# Patient Record
Sex: FEMALE | Race: WHITE | NOT HISPANIC OR LATINO | Employment: OTHER | URBAN - NONMETROPOLITAN AREA
[De-identification: names, ages, dates, MRNs, and addresses within clinical notes are randomized per-mention and may not be internally consistent; named-entity substitution may affect disease eponyms.]

---

## 2020-01-01 ENCOUNTER — OFFICE VISIT (OUTPATIENT)
Dept: URGENT CARE | Facility: PHYSICIAN GROUP | Age: 70
End: 2020-01-01
Payer: MEDICARE

## 2020-01-01 VITALS
OXYGEN SATURATION: 97 % | WEIGHT: 172 LBS | HEART RATE: 76 BPM | RESPIRATION RATE: 14 BRPM | SYSTOLIC BLOOD PRESSURE: 116 MMHG | DIASTOLIC BLOOD PRESSURE: 72 MMHG | TEMPERATURE: 97.8 F

## 2020-01-01 DIAGNOSIS — H10.9 CONJUNCTIVITIS OF BOTH EYES, UNSPECIFIED CONJUNCTIVITIS TYPE: ICD-10-CM

## 2020-01-01 PROCEDURE — 99204 OFFICE O/P NEW MOD 45 MIN: CPT | Performed by: PHYSICIAN ASSISTANT

## 2020-01-01 RX ORDER — POLYMYXIN B SULFATE AND TRIMETHOPRIM 1; 10000 MG/ML; [USP'U]/ML
1 SOLUTION OPHTHALMIC EVERY 4 HOURS
Qty: 10 ML | Refills: 0 | Status: SHIPPED | OUTPATIENT
Start: 2020-01-01 | End: 2020-01-08

## 2020-01-01 NOTE — PROGRESS NOTES
Chief Complaint   Patient presents with   • Eye Problem     B eyes redness       HISTORY OF PRESENT ILLNESS: Patient is a 69 y.o. female who presents today for the following:    Patient comes in for evaluation of bilateral eye redness and itching that started a few days ago.  She denies ocular pain, blurry vision, foreign body sensation, and contact lenses.  She has woken up with exudate in both eyes, having to clear them both.    There are no active problems to display for this patient.      Allergies:Codeine    Current Outpatient Medications Ordered in Epic   Medication Sig Dispense Refill   • polymixin-trimethoprim (POLYTRIM) 76964-0.1 UNIT/ML-% Solution Place 1 Drop in both eyes every 4 hours for 7 days. 10 mL 0     No current Epic-ordered facility-administered medications on file.        History reviewed. No pertinent past medical history.    Social History     Tobacco Use   • Smoking status: Never Smoker   • Smokeless tobacco: Never Used   Substance Use Topics   • Alcohol use: Not on file   • Drug use: Not on file       No family status information on file.   History reviewed. No pertinent family history.    Review of Systems:   Constitutional ROS: No unexpected change in weight, No weakness, No fatigue  Eye ROS: Positive for eye redness and exudate bilaterally.  Ear ROS: No drainage, No tinnitus or vertigo, No recent change in hearing  Mouth/Throat ROS: No teeth or gum problems, No bleeding gums, No tongue complaints  Neck ROS: No swollen glands, No significant pain in neck  Pulmonary ROS: No chronic cough, sputum, or hemoptysis, No dyspnea on exertion, No wheezing  Cardiovascular ROS: No diaphoresis, No edema, No palpitations  Musculoskeletal/Extremities ROS: No peripheral edema, No pain, redness or swelling on the joints  Hematologic/Lymphatic ROS: No chills, No night sweats, No weight loss  Skin/Integumentary ROS: No edema, No evidence of rash, No itching      Exam:  /72   Pulse 76   Temp 36.6 °C  (97.8 °F) (Temporal)   Resp 14   Wt 78 kg (172 lb)   SpO2 97%   General: Well developed, well nourished. No distress.    Eye: PERRL/EOMI; conjunctivae injected bilaterally without exudate.  Lids are normal.  Pulmonary: Unlabored respiratory effort.    Neurologic: Grossly nonfocal. No facial asymmetry noted  Skin: Warm, dry, good turgor. No rashes in visible areas.   Psych: Normal mood. Alert and oriented to person, place and time.    Assessment/Plan:  Symptoms are consistent with conjunctivitis.  Medication will be sent to the pharmacy.  Discussed red flags and ER precautions.  Discussed appropriate over-the-counter symptomatic medication, and when to return to clinic. Follow up for worsening or persistent symptoms.  1. Conjunctivitis of both eyes, unspecified conjunctivitis type  polymixin-trimethoprim (POLYTRIM) 64983-0.1 UNIT/ML-% Solution